# Patient Record
Sex: FEMALE | Race: WHITE | NOT HISPANIC OR LATINO | Employment: OTHER | URBAN - METROPOLITAN AREA
[De-identification: names, ages, dates, MRNs, and addresses within clinical notes are randomized per-mention and may not be internally consistent; named-entity substitution may affect disease eponyms.]

---

## 2021-10-14 ENCOUNTER — HISTORICAL (OUTPATIENT)
Dept: LAB | Facility: HOSPITAL | Age: 76
End: 2021-10-14

## 2021-10-14 ENCOUNTER — HISTORICAL (OUTPATIENT)
Dept: ADMINISTRATIVE | Facility: HOSPITAL | Age: 76
End: 2021-10-14

## 2021-10-14 LAB
ABS NEUT (OLG): 4.09 X10(3)/MCL (ref 2.1–9.2)
CRP SERPL HS-MCNC: 1 MG/L (ref 0–5)
ERYTHROCYTE [DISTWIDTH] IN BLOOD BY AUTOMATED COUNT: 16.9 % (ref 11.5–17)
ERYTHROCYTE [SEDIMENTATION RATE] IN BLOOD: 14 MM/HR (ref 0–30)
HCT VFR BLD AUTO: 38.3 % (ref 37–47)
HGB BLD-MCNC: 11.8 GM/DL (ref 12–16)
MCH RBC QN AUTO: 28 PG (ref 27–31)
MCHC RBC AUTO-ENTMCNC: 30.8 GM/DL (ref 33–36)
MCV RBC AUTO: 91 FL (ref 80–94)
NRBC BLD AUTO-RTO: 0 % (ref 0–0.2)
PLATELET # BLD AUTO: 211 X10(3)/MCL (ref 130–400)
PMV BLD AUTO: 9.5 FL (ref 7.4–10.4)
RBC # BLD AUTO: 4.21 X10(6)/MCL (ref 4.2–5.4)
SARS-COV-2 AG RESP QL IA.RAPID: NEGATIVE
WBC # SPEC AUTO: 7.5 X10(3)/MCL (ref 4.5–11.5)

## 2021-10-18 ENCOUNTER — HISTORICAL (OUTPATIENT)
Dept: SURGERY | Facility: HOSPITAL | Age: 76
End: 2021-10-18

## 2021-10-18 LAB — GRAM STN SPEC: NORMAL

## 2021-10-21 LAB — FINAL CULTURE: NORMAL

## 2021-10-23 LAB — FINAL CULTURE: NORMAL

## 2022-02-15 ENCOUNTER — TELEPHONE (OUTPATIENT)
Dept: GASTROENTEROLOGY | Facility: CLINIC | Age: 77
End: 2022-02-15

## 2022-02-15 NOTE — TELEPHONE ENCOUNTER
----- Message from Keerthi Gonzalez sent at 2/15/2022  3:19 PM CST -----  Pt calling to [facility] 3 yr colon due 10/2020 w/ RMM.      ----- Message -----  From: Imelda Salomon MA  Sent: 2/15/2022   3:10 PM CST  To: Keerthi Gonzalez      ----- Message -----  From: Claudette Maria  Sent: 2/15/2022   2:57 PM CST  To: Alisha Bhatti Staff     Patient need to speak to nurse regarding scheduling an renita. Call back number [phone]. Tks (home)

## 2022-04-10 ENCOUNTER — HISTORICAL (OUTPATIENT)
Dept: ADMINISTRATIVE | Facility: HOSPITAL | Age: 77
End: 2022-04-10

## 2022-04-26 VITALS
BODY MASS INDEX: 29.46 KG/M2 | HEIGHT: 63 IN | DIASTOLIC BLOOD PRESSURE: 77 MMHG | SYSTOLIC BLOOD PRESSURE: 138 MMHG | WEIGHT: 166.25 LBS

## 2022-05-01 NOTE — OP NOTE
Patient:   Hali Mauricio             MRN: [id number]            FIN: [id number]               Age:   75 years     Sex:  Female     :  1945   Associated Diagnoses:   Failed total hip arthroplasty   Author:   Bryan Hendrix MD      Operative Note   Operative Information   Date/ Time:  10/15/2018 07:38:00.     Procedures Performed: Left hip aspiration using fluoroscopy.     Preoperative Diagnosis: Failed total hip arthroplasty ([id number] T84.018A).     Postoperative Diagnosis: Failed total hip arthroplasty ([id number] T84.018A).     Surgeon: Bryan Hendrix MD.     Anesthesia: concious sedation.     Description of Procedure/Findings/    Complications: Patient was involved who complains of ongoing Left hip pain.  We discussed all treatment options.  Patient has tried and failed all measures.  Pre-op lab work was obtained with elevated inflammatory markers.  Given then, aspiration was discussed to rule out infectious etiology.  The risk, benefits, alternatives to aspiration of the left hip under anesthesia were discussed in detail including but limited to infection, bleeding, scarring, need for revision surgery, and resolution of pain.  Despite these risks patient elected to proceed with surgical mention.    Patient seen in preoperative holding.  The risk benefits alternatives again discussed.  All questions answered no guarantees made.  Patient was marked and consented.  Was taken the operating room.  placed under conscious sedation.  Using fluoroscopic guidance, the insertion site was localized.  It was injected with 3-4 cc 1% lidocaine.  Afterwards an 18-gauge spinal needle was then inserted using fluoroscopic guidance to the superior anterior aspect of the left hip/acetabulum.  2-3 cc of Isovue contrast was then injected.  We had an excellent arthrogram.  Afterwards, 2 cc of fluid was aspiration.  This fluid will be sent for cultures.  Needle was removed.  Band-Aid was placed over the injection  site.  Patient arose from anesthesia without issue.  Was transferred to recovery room in stable condition. postop he will be weightbearing as tolerated.  Patient will be discharged home.  .     Esimated blood loss: No blood loss.

## 2022-05-03 NOTE — HISTORICAL [facility] CERNER
This is a historical note converted from Gee. Formatting and pictures may have been removed.  Please reference Gee for original formatting and attached multimedia. Chief Complaint  New Patient referred by Dr Dang for left hip pain. States had left hip replacement in 2014 by Dr. Santillan. States has had major complications since/with surgery. Currently having intermitten aching pain in left hip, states tenderness as well.  History of Present Illness  75-year-old female presents today for evaluation of painful noted to the left side as well as bilateral knee pain. ?Patient has a history of knee arthritis treated by a colleague?with multiple injections.? She is?being contemplated for arthroplasty however there is concern for ongoing issues with her left hip.? Patient has a history of a fall with a femoral neck fracture. ?Treated with?total hip arthroplasty.? Her postoperative course was complicated by multiple dislocations. ?Eventually underwent revision?of what sounds like her acetabular component.? She was then?transferred to rehab?where she was diagnosed with an infection and treated with wound vacs?as well as prolonged IV antibiotics.? In the interim since her surgery, she continues to have complaints of pain to the?left hip located laterally. ?Denies any groin pain. ?Denies any buttock pain. ?Denies any?radiating pain.? Has had an injection?once?intertrochanteric bursa in the left side but is unsure whether this actually helped.  Review of Systems  Denies fevers, chills, chest pain, shortness of breath. Comprehensive review of systems performed and otherwise negative except as noted in HPI.  Physical Exam  Vitals & Measurements  T:?98.2? ?F (Oral)? HR:?76(Peripheral)? BP:?127/60?  HT:?161.00?cm? WT:?72.570?kg? BMI:?28?  General: No acute distress, alert and oriented, healthy appearing?  HEENT: Head is atraumatic, mucous membranes are moist?  Cardiovascular: Brisk capillary refill  Lungs: Breathing  non-labored?  Skin: no rashes appreciated?  Neurologic: Sensation is grossly intact distally  ?  Left hip:  Patient is a well-healed anterior incision. ?There are some scar tissue in the anterior incision although is mild.? Range of motion of the left hip without significant groin or buttock pain.? Internal and external rotation to?20 degrees. ?Negative Stinchfield.? Point tenderness laterally?over trochanteric bursa.  ?  Bilateral knees:  Varus deformity to bilateral knees. ?Sensation is intact distally. ?Brisk cap refill distally. ?Crepitus throughout range of motion.  Assessment/Plan  1.?Bilateral primary osteoarthritis of knee?M17.0  ?Patient bilateral knee osteoarthritis. ?These may manage appropriately.? We did discuss other possible options including IO vera?versus Zilretta?injections in the future.  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 ?Appears to be related to the increase in offset?to the left hip. ?There is no signs of any loosening on x-ray. ?Given her history however?ruling out infection with an aspiration?would be most appropriate. ?The risk benefits and alternatives aspiration of her left hip?were discussed in detail with the patient. ?All questions answered to her satisfaction. ?No guarantees made peer despite his risk elected proceed with aspiration of the left hip.  Orders:  COVID-19 ABBOTT, Routine collect, Nasal, Collected, 10/14/21 11:59:00 CDT by CLIENT, Stop date 10/14/21 11:59:00 CDT, Lab Collect, Micro Spec, ~INHERIT  Sedimentation Rate, Routine collect, 10/14/21 11:59:00 CDT, Blood, Collected, Stop date 10/14/21 11:59:00 CDT, Lab Collect, Venous Draw   Problem List/Past Medical History  Ongoing  High blood pressure disorder  High cholesterol  Multiple sclerosis  Historical  No qualifying data  Procedure/Surgical History  bonespur removal from lower back  Bunionectomy  gall bladder removal  Left  hip replacement  left wrist surgery  lump removed from right breast  Vaginal delivery   Medications  amLODIPine 2.5 mg oral tablet, 2.5 mg= 1 tab(s), Oral, Daily  famotidine 20 mg oral tablet, 20 mg= 1 tab(s), Oral, BID  lisinopril 40 mg oral tablet, 40 mg= 1 tab(s), Oral, Daily  mirtazapine 45 mg oral tablet, 45 mg= 1 tab(s), Oral, qPM  Pantoprazole 40 mg ORAL EC-Tablet, 40 mg= 1 tab(s), Oral, Daily  rOPINIRole 2 mg oral tablet, 2 mg= 1 tab(s), Oral, qPM  rosuvastatin 20 mg oral tablet, 20 mg= 1 tab(s), Oral, qPM  sertraline 100 mg oral tablet, 100 mg= 1 tab(s), Oral, Daily  traZODONE 50 mg oral tablet ( Desyrel ), 50 mg= 1 tab(s), Oral, qPM  Allergies  No Known Medication Allergies  Social History  Abuse/Neglect  No, No, Yes, 10/14/2021  Alcohol  Current, Beer, Wine, 1-2 times per month, 10/14/2021  Substance Use  Never, 10/14/2021  Tobacco  Never (less than 100 in lifetime), No, 10/14/2021  Family History  Family history is negative  Health Maintenance  Health Maintenance  ???Pending?(in the next year)  ??? ??OverDue  ??? ? ? ?Advance Directive due??01/02/21??and every 1??year(s)  ??? ? ? ?Alcohol Misuse Screening due??01/02/21??and every 1??year(s)  ??? ? ? ?Cognitive Screening due??01/02/21??and every 1??year(s)  ??? ? ? ?Functional Assessment due??01/02/21??and every 1??year(s)  ??? ??Due?  ??? ? ? ?ADL Screening due??10/14/21??and every 1??year(s)  ??? ? ? ?Aspirin Therapy for CVD Prevention due??10/14/21??and every 1??year(s)  ??? ? ? ?Bone Density Screening due??10/14/21??Variable frequency  ??? ? ? ?Medicare Annual Wellness Exam due??10/14/21??and every 1??year(s)  ??? ? ? ?Tetanus Vaccine due??10/14/21??and every 10??year(s)  ??? ??Due In Future?  ??? ? ? ?Obesity Screening not due until??01/01/22??and every 1??year(s)  ??? ? ? ?Fall Risk Assessment not due until??01/02/22??and every 1??year(s)  ???Satisfied?(in the past 1 year)  ??? ??Satisfied?  ??? ? ? ?Blood Pressure Screening on??10/14/21.??Satisfied  by Fany Huerta  ??? ? ? ?Body Mass Index Check on??10/14/21.??Satisfied by Fany Huerta  ??? ? ? ?Fall Risk Assessment on??10/14/21.??Satisfied by Fany Huerta  ??? ? ? ?Obesity Screening on??10/14/21.??Satisfied by Fany Huerta  ?  Diagnostic Results  AP lateral left hip as well as bilateral knees reviewed. ?Patients left hip shows implants are well fixed. ?There is no signs of loosening or subsidence. ?Slight increase in offset?with regards to the left hip.? Bilateral knees show end-stage arthritis with loss of joint space and bone-on-bone articulation.

## 2022-08-31 DIAGNOSIS — Z12.11 SCREENING FOR COLON CANCER: Primary | ICD-10-CM

## 2023-02-20 ENCOUNTER — TELEPHONE (OUTPATIENT)
Dept: GASTROENTEROLOGY | Facility: CLINIC | Age: 78
End: 2023-02-20
Payer: MEDICARE

## 2023-02-20 VITALS — BODY MASS INDEX: 29.44 KG/M2 | WEIGHT: 160 LBS | HEIGHT: 62 IN

## 2023-02-20 DIAGNOSIS — Z86.010 HISTORY OF COLON POLYPS: Primary | ICD-10-CM

## 2023-02-20 DIAGNOSIS — Z12.11 SCREENING FOR COLON CANCER: ICD-10-CM

## 2023-02-20 RX ORDER — LISINOPRIL 40 MG/1
TABLET ORAL
COMMUNITY
Start: 2023-02-16

## 2023-02-20 RX ORDER — ALPRAZOLAM 1 MG/1
TABLET ORAL
COMMUNITY
Start: 2023-01-30

## 2023-02-20 RX ORDER — SERTRALINE HYDROCHLORIDE 100 MG/1
TABLET, FILM COATED ORAL
COMMUNITY
Start: 2023-02-01

## 2023-02-20 RX ORDER — ROPINIROLE 2 MG/1
TABLET, FILM COATED ORAL
COMMUNITY
Start: 2023-02-15

## 2023-02-20 RX ORDER — SOLIFENACIN SUCCINATE 10 MG/1
TABLET, FILM COATED ORAL
COMMUNITY
Start: 2023-01-27

## 2023-02-20 RX ORDER — PANTOPRAZOLE SODIUM 40 MG/1
TABLET, DELAYED RELEASE ORAL
COMMUNITY
Start: 2023-01-20

## 2023-02-20 RX ORDER — HYDROCHLOROTHIAZIDE 12.5 MG/1
TABLET ORAL
COMMUNITY
Start: 2023-02-20

## 2023-02-20 RX ORDER — ROSUVASTATIN CALCIUM 20 MG/1
TABLET, COATED ORAL
COMMUNITY
Start: 2023-02-15

## 2023-02-20 NOTE — TELEPHONE ENCOUNTER
Patient came into office and scheduled Colonoscopy @ [facility] with Dr Brito on 3/29/23.  BMI 29.26.  I discussed prep instructions and Medicare instructions, and gave instructions to patient.  Yonathan

## 2023-02-22 RX ORDER — SOD SULF/POT CHLORIDE/MAG SULF 1.479 G
12 TABLET ORAL DAILY
Qty: 24 TABLET | Refills: 0 | Status: SHIPPED | OUTPATIENT
Start: 2023-02-22

## 2023-02-22 NOTE — TELEPHONE ENCOUNTER
"[facility]  [address] [place] [address]  Phone: [phone]  Fax: [phone]    History & Physical         Provider: Dr. Davy Brito    Patient Name: Hali GARCIA (age):1945  77 y.o.           Gender: female   Phone: [phone]     Referring Physician: Sabas Dang     Vital Signs:   Height - 5' 2"  Weight - 160 lbs  BMI - 29.26     Plan: Colonoscopy    Encounter Diagnoses   Name Primary?    History of colon polyps Yes    Screening for colon cancer            History:      Past Medical History:   Diagnosis Date    BMI 29.0-29.9,adult     Depression     GERD (gastroesophageal reflux disease)     History of gallstones     Hyperactivity of bladder     Insomnia     Mixed hyperlipidemia     Multiple sclerosis     Personal history of colonic polyps     Restless leg       Past Surgical History:   Procedure Laterality Date    BUNIONECTOMY Bilateral     CATARACT EXTRACTION Bilateral     CHOLECYSTECTOMY      COLONOSCOPY W/ POLYPECTOMY  10/10/2017    HIP REPLACEMENT ARTHROPLASTY Left 2014    KIDNEY STONE SURGERY      LUMPECTOMY, BREAST Left     TOTAL KNEE ARTHROPLASTY Left     UPPER GASTROINTESTINAL ENDOSCOPY  2018    WRIST SURGERY Left       Medication List with Changes/Refills   New Medications    SOD SULF-POT CHLORIDE-MAG SULF (SUTAB) 1.479-0.188- 0.225 GRAM TABLET    Take 12 tablets by mouth once daily. Take according to package instructions with indicated amount of water. No breakfast day before test. May substitute with Suprep, Clenpiq, Plenvu, Moviprep or GoLytely based on Rx plan and patient preference.   Current Medications    ALPRAZOLAM (XANAX) 1 MG TABLET        HYDROCHLOROTHIAZIDE (HYDRODIURIL) 12.5 MG TAB        LISINOPRIL (PRINIVIL,ZESTRIL) 40 MG TABLET        PANTOPRAZOLE (PROTONIX) 40 MG TABLET        ROPINIROLE (REQUIP) 2 MG TABLET        ROSUVASTATIN (CRESTOR) 20 MG " TABLET        SERTRALINE (ZOLOFT) 100 MG TABLET        SOLIFENACIN (VESICARE) 10 MG TABLET          Review of patient's allergies indicates:   Allergen Reactions    Codeine Nausea Only      Family History   Problem Relation Age of Onset    Other Mother         Natural Causes    Suicide Father     Diabetes Brother     Heart disease Maternal Grandmother     Pancreatic cancer Maternal Grandfather     No Known Problems Paternal Grandmother     No Known Problems Paternal Grandfather       Social History     Tobacco Use    Smoking status: Never    Smokeless tobacco: Never   Substance Use Topics    Alcohol use: Yes     Comment: Occasional    Drug use: Never        Physical Examination:     General Appearance:___________________________  HEENT: _____________________________________  Abdomen:____________________________________  Heart:________________________________________  Lungs:_______________________________________  Extremities:___________________________________  Skin:_________________________________________  Endocrine:____________________________________  Genitourinary:_________________________________  Neurological:__________________________________      Patient has been evaluated immediately prior to sedation and is medically cleared for endoscopy with IVCS as an ASA class: ______      Physician Signature: _________________________       Date: ________  Time: ________

## 2023-04-17 DIAGNOSIS — Z12.11 SCREENING FOR COLON CANCER: ICD-10-CM

## 2023-04-17 DIAGNOSIS — Z86.010 HISTORY OF COLON POLYPS: Primary | ICD-10-CM

## 2023-04-17 NOTE — TELEPHONE ENCOUNTER
----- Message from Yanet Laird sent at 4/17/2023 10:21 AM CDT -----  Contact: Hali Lal needs a call back at [phone], Regards to getting her colonoscopy reschedule.    Thanks  Td

## 2023-04-17 NOTE — TELEPHONE ENCOUNTER
Pt returned call and she states she had a procedure scheduled but had to cx last porcedure due to her not holding down the pill so she requested for a liquid form this time and got r/s

## 2023-04-17 NOTE — TELEPHONE ENCOUNTER
Returned call to pt and l/m for her to call back to be scheduled w/ [facility] for a colon consult repeat 3 yr

## 2023-04-18 RX ORDER — SODIUM, POTASSIUM,MAG SULFATES 17.5-3.13G
1 SOLUTION, RECONSTITUTED, ORAL ORAL ONCE
Qty: 1 KIT | Refills: 0 | Status: SHIPPED | OUTPATIENT
Start: 2023-04-18 | End: 2023-04-18

## 2023-04-24 VITALS — BODY MASS INDEX: 29.44 KG/M2 | WEIGHT: 160 LBS | HEIGHT: 62 IN

## 2023-04-24 NOTE — TELEPHONE ENCOUNTER
"[facility]  [address] [place] [address]  Phone: [phone]  Fax: [phone]    History & Physical         Provider: Dr. Davy Brito    Patient Name: Hali GARCIA (age):1945  77 y.o.           Gender: female   Phone: [phone]     Referring Physician: Sabas Dang     Vital Signs:   Height - 5' 2"  Weight - 160 lbs  BMI -  29.3    Plan: Colonoscopy    Encounter Diagnoses   Name Primary?    History of colon polyps Yes    Screening for colon cancer            History:      Past Medical History:   Diagnosis Date    BMI 29.0-29.9,adult     Depression     GERD (gastroesophageal reflux disease)     History of gallstones     Hyperactivity of bladder     Insomnia     Mixed hyperlipidemia     Multiple sclerosis     Personal history of colonic polyps     Restless leg       Past Surgical History:   Procedure Laterality Date    BUNIONECTOMY Bilateral     CATARACT EXTRACTION Bilateral     CHOLECYSTECTOMY      COLONOSCOPY W/ POLYPECTOMY  10/10/2017    HIP REPLACEMENT ARTHROPLASTY Left 2014    KIDNEY STONE SURGERY      LUMPECTOMY, BREAST Left     TOTAL KNEE ARTHROPLASTY Left     UPPER GASTROINTESTINAL ENDOSCOPY  2018    WRIST SURGERY Left       Medication List with Changes/Refills   New Medications    SODIUM,POTASSIUM,MAG SULFATES (SUPREP BOWEL PREP KIT) 17.5-3.13-1.6 GRAM SOLR    Take 177 mLs by mouth once. Take according to kit instructions but DO NOT eat breakfast the morning of procedure. for 1 dose   Current Medications    ALPRAZOLAM (XANAX) 1 MG TABLET        HYDROCHLOROTHIAZIDE (HYDRODIURIL) 12.5 MG TAB        LISINOPRIL (PRINIVIL,ZESTRIL) 40 MG TABLET        PANTOPRAZOLE (PROTONIX) 40 MG TABLET        ROPINIROLE (REQUIP) 2 MG TABLET        ROSUVASTATIN (CRESTOR) 20 MG TABLET        SERTRALINE (ZOLOFT) 100 MG TABLET        SOD SULF-POT CHLORIDE-MAG SULF (SUTAB) 1.479-0.188- 0.225 " GRAM TABLET    Take 12 tablets by mouth once daily. Take according to package instructions with indicated amount of water. No breakfast day before test. May substitute with Suprep, Clenpiq, Plenvu, Moviprep or GoLytely based on Rx plan and patient preference.    SOLIFENACIN (VESICARE) 10 MG TABLET          Review of patient's allergies indicates:   Allergen Reactions    Codeine Nausea Only      Family History   Problem Relation Age of Onset    Other Mother         Natural Causes    Suicide Father     Diabetes Brother     Heart disease Maternal Grandmother     Pancreatic cancer Maternal Grandfather     No Known Problems Paternal Grandmother     No Known Problems Paternal Grandfather       Social History     Tobacco Use    Smoking status: Never    Smokeless tobacco: Never   Substance Use Topics    Alcohol use: Yes     Comment: Occasional    Drug use: Never        Physical Examination:     General Appearance:___________________________  HEENT: _____________________________________  Abdomen:____________________________________  Heart:________________________________________  Lungs:_______________________________________  Extremities:___________________________________  Skin:_________________________________________  Endocrine:____________________________________  Genitourinary:_________________________________  Neurological:__________________________________      Patient has been evaluated immediately prior to sedation and is medically cleared for endoscopy with IVCS as an ASA class: ______      Physician Signature: _________________________       Date: ________  Time: ________

## 2023-05-17 ENCOUNTER — OUTSIDE PLACE OF SERVICE (OUTPATIENT)
Dept: GASTROENTEROLOGY | Facility: CLINIC | Age: 78
End: 2023-05-17

## 2023-05-17 LAB — CRC RECOMMENDATION EXT: NORMAL

## 2023-05-17 PROCEDURE — 45385 PR COLONOSCOPY,REMV LESN,SNARE: ICD-10-PCS | Mod: PT,,, | Performed by: INTERNAL MEDICINE

## 2023-05-17 PROCEDURE — 45385 COLONOSCOPY W/LESION REMOVAL: CPT | Mod: PT,,, | Performed by: INTERNAL MEDICINE

## 2023-05-26 ENCOUNTER — TELEPHONE (OUTPATIENT)
Dept: GASTROENTEROLOGY | Facility: CLINIC | Age: 78
End: 2023-05-26
Payer: MEDICARE

## 2023-05-26 NOTE — TELEPHONE ENCOUNTER
Called patient and informed of colonoscopy results via VM, encouraged to call with  questions or concerns.

## 2023-06-02 ENCOUNTER — DOCUMENTATION ONLY (OUTPATIENT)
Dept: GASTROENTEROLOGY | Facility: CLINIC | Age: 78
End: 2023-06-02
Payer: MEDICARE

## 2023-06-28 DIAGNOSIS — R53.83 FATIGUE, UNSPECIFIED TYPE: Primary | ICD-10-CM

## 2023-06-28 DIAGNOSIS — G35 MULTIPLE SCLEROSIS: ICD-10-CM

## 2023-07-17 ENCOUNTER — OFFICE VISIT (OUTPATIENT)
Dept: NEUROLOGY | Facility: CLINIC | Age: 78
End: 2023-07-17
Payer: MEDICARE

## 2023-07-17 VITALS
BODY MASS INDEX: 29.44 KG/M2 | WEIGHT: 160 LBS | SYSTOLIC BLOOD PRESSURE: 132 MMHG | DIASTOLIC BLOOD PRESSURE: 70 MMHG | HEIGHT: 62 IN

## 2023-07-17 DIAGNOSIS — G35 MULTIPLE SCLEROSIS: ICD-10-CM

## 2023-07-17 DIAGNOSIS — I67.82 CHRONIC CEREBRAL ISCHEMIA: ICD-10-CM

## 2023-07-17 DIAGNOSIS — R26.9 NEUROLOGIC GAIT DISORDER: Primary | ICD-10-CM

## 2023-07-17 DIAGNOSIS — R53.83 FATIGUE, UNSPECIFIED TYPE: ICD-10-CM

## 2023-07-17 PROCEDURE — 99205 OFFICE O/P NEW HI 60 MIN: CPT | Mod: S$PBB,,, | Performed by: SPECIALIST

## 2023-07-17 PROCEDURE — 99213 OFFICE O/P EST LOW 20 MIN: CPT | Mod: PBBFAC | Performed by: SPECIALIST

## 2023-07-17 PROCEDURE — 99999 PR PBB SHADOW E&M-EST. PATIENT-LVL III: CPT | Mod: PBBFAC,,, | Performed by: SPECIALIST

## 2023-07-17 PROCEDURE — 99205 PR OFFICE/OUTPT VISIT, NEW, LEVL V, 60-74 MIN: ICD-10-PCS | Mod: S$PBB,,, | Performed by: SPECIALIST

## 2023-07-17 PROCEDURE — 99999 PR PBB SHADOW E&M-EST. PATIENT-LVL III: ICD-10-PCS | Mod: PBBFAC,,, | Performed by: SPECIALIST

## 2023-07-17 RX ORDER — FAMOTIDINE 20 MG/1
TABLET, FILM COATED ORAL
COMMUNITY
Start: 2023-05-24

## 2023-07-17 RX ORDER — DULOXETIN HYDROCHLORIDE 60 MG/1
60 CAPSULE, DELAYED RELEASE ORAL
COMMUNITY
Start: 2023-06-14

## 2023-07-17 NOTE — PROGRESS NOTES
Subjective:      @Patient ID: Hali Mauricio is a 77 y.o. female.    Chief Complaint: NP ref by Dr Dang for neuro cons to john for MS.     HPI:            Pt's son (Karlo) is here today. Pt was Dx w MS in 2001.  SS done 12/2021 Dx w TAVON but was unable to tolerate the CPAP.   Pt states she is off balance w falls.   Ambulates w a cane.   States he foot gets caught while she is walking.   States she is very tired all of the time.   Pt denies any vision., bowel or bladder problems.     Sleeps 10-11 hrs a night, awakens 3 times for no reason and it takes a while to go back to sleep.   Awakens un refreshed and has EDS Naps 2-3 hrs a day.       Notes may also be on facesheet for HPI, ROS, and other sections     Review of Systems         Social History     Tobacco Use    Smoking status: Never    Smokeless tobacco: Never   Substance Use Topics    Alcohol use: Yes     Comment: Occasional    Drug use: Never      [] Single     []     [] []    [] Working     [] Retired, worked as:   [] Drives     [] Does not drive       ----------------------------  BMI 29.0-29.9,adult  Depression  GERD (gastroesophageal reflux disease)  History of gallstones  Hyperactivity of bladder  Insomnia  Mixed hyperlipidemia  Multiple sclerosis  Personal history of colonic polyps  Restless leg    Current Outpatient Medications   Medication Instructions    ALPRAZolam (XANAX) 1 MG tablet No dose, route, or frequency recorded.    DULoxetine (CYMBALTA) 60 mg, Oral    famotidine (PEPCID) 20 MG tablet No dose, route, or frequency recorded.    hydroCHLOROthiazide (HYDRODIURIL) 12.5 MG Tab No dose, route, or frequency recorded.    lisinopriL (PRINIVIL,ZESTRIL) 40 MG tablet No dose, route, or frequency recorded.    pantoprazole (PROTONIX) 40 MG tablet No dose, route, or frequency recorded.    rOPINIRole (REQUIP) 2 MG tablet No dose, route, or frequency recorded.    rosuvastatin (CRESTOR) 20 MG tablet No dose, route, or frequency recorded.  "   sertraline (ZOLOFT) 100 MG tablet No dose, route, or frequency recorded.    sod sulf-pot chloride-mag sulf (SUTAB) 1.479-0.188- 0.225 gram tablet 12 tablets, Oral, Daily, Take according to package instructions with indicated amount of water. No breakfast day before test. May substitute with Suprep, Clenpiq, Plenvu, Moviprep or GoLytely based on Rx plan and patient preference.    solifenacin (VESICARE) 10 MG tablet No dose, route, or frequency recorded.      There are no discontinued medications.      Objective:        Exam:   Visit Vitals  /70   Ht 5' 2" (1.575 m)   Wt 72.6 kg (160 lb)   BMI 29.26 kg/m²       General Exam  [] Unaccompanied   [x] Accompanied, by__ []Spouse     [x]Child            []_____________            body habitus_ Body mass index is 29.26 kg/m².    []Gen exam overall unremarkable    []Abnormalities, if present, checked     []Mental Status_alert and appropriate    []Oropharynx_Mallampati grade_1 or 2  [] OP   M3    [] M4  []Neck_ no bruits     [] Bruit   [x]Heart__ RRR s murmurs or extra beats  [] Irreg  []murmur  []Extremities_ no edema or lesions   [] Extr edema     Neurological:  []Normal neuro exam            [x]Cortical function seems normal  []Abnormalities, if present, checked     [x]Speech __ normal    []    []Cranial nerves:    []  [x]CN 2 VF_ok     []  []Fundi_ normal     []  [x]CN 3, 4, 6 EOMs_ok   []  []CN 3, pupils_ok   []  [x]CN 7_no lower face asymmetry  []  [x]CN 8_hearing _ ok   []  [x]CN 12 tongue_ok   []    [x]Motor__ normal all groups   []  [x]Tone: normal     []  []Reflexes__ normal or unremarkable  [x] Br reflexes present but KJs or AJ's not   [x]Vib Sens_ normal in extr's incl toes  []  [x]Pin Sens_    []  [x]Plantars__ flat     []  [x]Tremor: _ none    []  [x]Coordination: _ F to N normal  []  []Gait_      [x]  Cautious;  cane   []Romberg: negative    [x]  Cautious     []MMSE; if done:  No flowsheet data found.     Neuroimaging:  Study / Studies:   [] Images and " imaging reports reviewed.      Rads summary:          My comments:     Labs:      [x]  New Patient     [x]  Multiple Issues/ diagnoses or problems  [if not enumerated in note then discussed but not documented]    Complexity of Data:      [x] High   [] Moderate   [x] Images and reports reviewed:  [x] History obtained from family or accompaniment:   [] Other studies reviewed   [x] Studies considered or discussed but not ordered __  c spine MRI to assess for spinal canal stenosis or cord compression but no [facility] findings in LE's   [] Studies ordered __   [x] Differential Diagnoses discussed __    Risks:   [x] High   [] Moderate   [] (poss or definite) neurodegenerative condition and inherent progression  [] () autoimmune condition with possibility of flares or unexpected attack  [] () seiz d.o. with possib of recurr seiz's   [x] Cerebrovasc ds with risk of recurrent stroke  [] CNS meds (and/or) potentially high risk non CNS meds taken or discussed which may cause med or behav SE's  [x] Fall risk  [x] Driving discussed   [] Diagnosis unclear or DDx wide making risk uncertain to high  []:    MDM:      [x] High      despite no orders   [] Moderate         Assessment/Plan:         ICD-10-CM ICD-9-CM   1. Neurologic gait disorder  R26.9 781.2   2. Chronic cerebral ischemia  I67.82 437.1             Other Comments / Follow Up:        A c spine MRI could be done but if surgical implications were found she would not be eager for surgery     Follow up for PRN.    Bean Schwarz MD FINN  [facility] Medical Director   [facility], [facility]      Addendum:   C spine MRI images and report reviewed    Rads: mod central canal stenosis c2-3 and c3-4  My comments: no cord compression or cord signal ch's     No new orders

## 2023-07-26 ENCOUNTER — TELEPHONE (OUTPATIENT)
Dept: NEUROLOGY | Facility: CLINIC | Age: 78
End: 2023-07-26
Payer: MEDICARE

## 2023-07-26 DIAGNOSIS — R26.9 NEUROLOGIC GAIT DISORDER: Primary | ICD-10-CM

## 2023-07-26 DIAGNOSIS — G35 MULTIPLE SCLEROSIS: ICD-10-CM

## 2023-07-26 NOTE — TELEPHONE ENCOUNTER
Mri order placed     No further discussion about surgery until pictures obtained Mri order placed     No further discussion about surgery until pictures obtained

## 2023-07-26 NOTE — TELEPHONE ENCOUNTER
Patient states during new patient appt, surgery was discussed and she was informed she would need MRI.     At the time she declined.     Asking if she can have MRI ordered and sent to [facility] in [facility].     Also asking for call back to further discuss surgery. She has a few questions about surgery, like what is the success rate?     Phone: [phone]

## 2023-08-01 NOTE — TELEPHONE ENCOUNTER
Faxed order to [facility] as requested, with notation on order to give pt CD of images to bring to office